# Patient Record
Sex: FEMALE | Race: OTHER | Employment: FULL TIME | URBAN - METROPOLITAN AREA
[De-identification: names, ages, dates, MRNs, and addresses within clinical notes are randomized per-mention and may not be internally consistent; named-entity substitution may affect disease eponyms.]

---
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
## 2018-08-23 PROCEDURE — 87081 CULTURE SCREEN ONLY: CPT | Performed by: EMERGENCY MEDICINE

## 2019-09-21 PROCEDURE — 87186 SC STD MICRODIL/AGAR DIL: CPT | Performed by: OBSTETRICS & GYNECOLOGY

## 2019-09-21 PROCEDURE — 87088 URINE BACTERIA CULTURE: CPT | Performed by: OBSTETRICS & GYNECOLOGY

## 2019-09-21 PROCEDURE — 87086 URINE CULTURE/COLONY COUNT: CPT | Performed by: OBSTETRICS & GYNECOLOGY

## 2020-04-15 PROBLEM — E66.9 OBESITY (BMI 30-39.9): Status: ACTIVE | Noted: 2020-04-15

## 2023-11-03 ENCOUNTER — OFFICE VISIT (OUTPATIENT)
Dept: OBGYN CLINIC | Facility: CLINIC | Age: 23
End: 2023-11-03

## 2023-11-03 VITALS
DIASTOLIC BLOOD PRESSURE: 81 MMHG | HEART RATE: 74 BPM | HEIGHT: 59 IN | BODY MASS INDEX: 30.44 KG/M2 | WEIGHT: 151 LBS | SYSTOLIC BLOOD PRESSURE: 114 MMHG | TEMPERATURE: 99 F

## 2023-11-03 DIAGNOSIS — Z11.3 SCREEN FOR STD (SEXUALLY TRANSMITTED DISEASE): ICD-10-CM

## 2023-11-03 DIAGNOSIS — Z28.21 INFLUENZA VACCINE REFUSED: Primary | ICD-10-CM

## 2023-11-03 DIAGNOSIS — Z01.419 WELL WOMAN EXAM WITH ROUTINE GYNECOLOGICAL EXAM: ICD-10-CM

## 2023-11-03 DIAGNOSIS — R10.2 PELVIC PAIN: ICD-10-CM

## 2023-11-03 DIAGNOSIS — Z30.431 IUD SURVEILLANCE: ICD-10-CM

## 2023-11-03 PROCEDURE — 87591 N.GONORRHOEAE DNA AMP PROB: CPT | Performed by: ADVANCED PRACTICE MIDWIFE

## 2023-11-03 PROCEDURE — 87491 CHLMYD TRACH DNA AMP PROBE: CPT | Performed by: ADVANCED PRACTICE MIDWIFE

## 2023-11-05 ENCOUNTER — APPOINTMENT (OUTPATIENT)
Dept: ULTRASOUND IMAGING | Facility: HOSPITAL | Age: 23
End: 2023-11-05
Attending: EMERGENCY MEDICINE
Payer: MEDICAID

## 2023-11-05 ENCOUNTER — HOSPITAL ENCOUNTER (EMERGENCY)
Facility: HOSPITAL | Age: 23
Discharge: HOME | End: 2023-11-05
Attending: EMERGENCY MEDICINE
Payer: MEDICAID

## 2023-11-05 VITALS
HEIGHT: 59 IN | TEMPERATURE: 99 F | DIASTOLIC BLOOD PRESSURE: 81 MMHG | WEIGHT: 140 LBS | HEART RATE: 91 BPM | RESPIRATION RATE: 19 BRPM | OXYGEN SATURATION: 96 % | BODY MASS INDEX: 28.22 KG/M2 | SYSTOLIC BLOOD PRESSURE: 141 MMHG

## 2023-11-05 DIAGNOSIS — R10.30 LOWER ABDOMINAL PAIN: Primary | ICD-10-CM

## 2023-11-05 DIAGNOSIS — N83.201 CYST OF RIGHT OVARY: ICD-10-CM

## 2023-11-05 LAB
ALBUMIN SERPL-MCNC: 4.5 G/DL (ref 3.2–4.8)
ALBUMIN/GLOB SERPL: 1.5 {RATIO} (ref 1–2)
ALP LIVER SERPL-CCNC: 89 U/L
ALT SERPL-CCNC: 10 U/L
ANION GAP SERPL CALC-SCNC: 7 MMOL/L (ref 0–18)
AST SERPL-CCNC: 15 U/L (ref ?–34)
B-HCG UR QL: NEGATIVE
BASOPHILS # BLD AUTO: 0.06 X10(3) UL (ref 0–0.2)
BASOPHILS NFR BLD AUTO: 0.6 %
BILIRUB SERPL-MCNC: 0.3 MG/DL (ref 0.3–1.2)
BILIRUB UR QL: NEGATIVE
BUN BLD-MCNC: 12 MG/DL (ref 9–23)
BUN/CREAT SERPL: 15 (ref 10–20)
CALCIUM BLD-MCNC: 9.5 MG/DL (ref 8.7–10.4)
CHLORIDE SERPL-SCNC: 103 MMOL/L (ref 98–112)
CO2 SERPL-SCNC: 26 MMOL/L (ref 21–32)
CREAT BLD-MCNC: 0.8 MG/DL
DEPRECATED RDW RBC AUTO: 43 FL (ref 35.1–46.3)
EGFRCR SERPLBLD CKD-EPI 2021: 105 ML/MIN/1.73M2 (ref 60–?)
EOSINOPHIL # BLD AUTO: 0.13 X10(3) UL (ref 0–0.7)
EOSINOPHIL NFR BLD AUTO: 1.2 %
ERYTHROCYTE [DISTWIDTH] IN BLOOD BY AUTOMATED COUNT: 13.2 % (ref 11–15)
GLOBULIN PLAS-MCNC: 3 G/DL (ref 2.8–4.4)
GLUCOSE BLD-MCNC: 90 MG/DL (ref 70–99)
GLUCOSE UR-MCNC: NORMAL MG/DL
HCT VFR BLD AUTO: 40.4 %
HGB BLD-MCNC: 13.6 G/DL
HGB UR QL STRIP.AUTO: NEGATIVE
IMM GRANULOCYTES # BLD AUTO: 0.03 X10(3) UL (ref 0–1)
IMM GRANULOCYTES NFR BLD: 0.3 %
KETONES UR-MCNC: NEGATIVE MG/DL
LEUKOCYTE ESTERASE UR QL STRIP.AUTO: 75
LYMPHOCYTES # BLD AUTO: 2.6 X10(3) UL (ref 1–4)
LYMPHOCYTES NFR BLD AUTO: 24.8 %
MCH RBC QN AUTO: 29.6 PG (ref 26–34)
MCHC RBC AUTO-ENTMCNC: 33.7 G/DL (ref 31–37)
MCV RBC AUTO: 88 FL
MONOCYTES # BLD AUTO: 0.89 X10(3) UL (ref 0.1–1)
MONOCYTES NFR BLD AUTO: 8.5 %
NEUTROPHILS # BLD AUTO: 6.77 X10 (3) UL (ref 1.5–7.7)
NEUTROPHILS # BLD AUTO: 6.77 X10(3) UL (ref 1.5–7.7)
NEUTROPHILS NFR BLD AUTO: 64.6 %
NITRITE UR QL STRIP.AUTO: NEGATIVE
OSMOLALITY SERPL CALC.SUM OF ELEC: 281 MOSM/KG (ref 275–295)
PH UR: 6.5 [PH] (ref 5–8)
PLATELET # BLD AUTO: 216 10(3)UL (ref 150–450)
POTASSIUM SERPL-SCNC: 3.9 MMOL/L (ref 3.5–5.1)
PROT SERPL-MCNC: 7.5 G/DL (ref 5.7–8.2)
PROT UR-MCNC: NEGATIVE MG/DL
RBC # BLD AUTO: 4.59 X10(6)UL
SODIUM SERPL-SCNC: 136 MMOL/L (ref 136–145)
SP GR UR STRIP: 1.02 (ref 1–1.03)
UROBILINOGEN UR STRIP-ACNC: NORMAL
WBC # BLD AUTO: 10.5 X10(3) UL (ref 4–11)

## 2023-11-05 PROCEDURE — 93975 VASCULAR STUDY: CPT | Performed by: EMERGENCY MEDICINE

## 2023-11-05 PROCEDURE — 76856 US EXAM PELVIC COMPLETE: CPT | Performed by: EMERGENCY MEDICINE

## 2023-11-05 PROCEDURE — 81001 URINALYSIS AUTO W/SCOPE: CPT | Performed by: EMERGENCY MEDICINE

## 2023-11-05 PROCEDURE — 81025 URINE PREGNANCY TEST: CPT

## 2023-11-05 PROCEDURE — 80053 COMPREHEN METABOLIC PANEL: CPT

## 2023-11-05 PROCEDURE — 87086 URINE CULTURE/COLONY COUNT: CPT | Performed by: EMERGENCY MEDICINE

## 2023-11-05 PROCEDURE — 85025 COMPLETE CBC W/AUTO DIFF WBC: CPT

## 2023-11-05 PROCEDURE — 85025 COMPLETE CBC W/AUTO DIFF WBC: CPT | Performed by: EMERGENCY MEDICINE

## 2023-11-05 PROCEDURE — 80053 COMPREHEN METABOLIC PANEL: CPT | Performed by: EMERGENCY MEDICINE

## 2023-11-05 PROCEDURE — 99284 EMERGENCY DEPT VISIT MOD MDM: CPT

## 2023-11-05 PROCEDURE — 76830 TRANSVAGINAL US NON-OB: CPT | Performed by: EMERGENCY MEDICINE

## 2023-11-05 PROCEDURE — 36415 COLL VENOUS BLD VENIPUNCTURE: CPT

## 2023-11-05 PROCEDURE — 99285 EMERGENCY DEPT VISIT HI MDM: CPT

## 2023-11-06 ENCOUNTER — TELEPHONE (OUTPATIENT)
Dept: OBGYN CLINIC | Facility: CLINIC | Age: 23
End: 2023-11-06

## 2023-11-06 DIAGNOSIS — R10.9 ABDOMINAL PAIN, UNSPECIFIED ABDOMINAL LOCATION: Primary | ICD-10-CM

## 2023-11-06 LAB
C TRACH DNA SPEC QL NAA+PROBE: NEGATIVE
N GONORRHOEA DNA SPEC QL NAA+PROBE: NEGATIVE

## 2023-11-06 NOTE — TELEPHONE ENCOUNTER
Patient called, in need of ultrasound. Patient called central scheduling and was directed to [facility] for ultrasound. Please call.

## 2023-11-06 NOTE — ED INITIAL ASSESSMENT (HPI)
Patient reports lower abdominal pain over the last few months, worsening today on the RLQ. Patient reports she was advised to schedule an US for symptoms but has not been able to. Patient reports an episode of vomiting today. Denies fever.

## 2023-11-07 NOTE — TELEPHONE ENCOUNTER
Name and  verified    Patient given PF recommendations. Provided scheduling information for [facility]. Referral placed for patient insurance. Upcoming appointment canceled.

## 2023-11-07 NOTE — TELEPHONE ENCOUNTER
I have reviewed the US results and would like for her to be referred to [facility] for removal of her IUD and evaluation of her ovary. She has an appointment with me next Tuesday 11/14 and would like that canceled.

## 2023-11-29 ENCOUNTER — OFFICE VISIT (OUTPATIENT)
Dept: OBGYN CLINIC | Facility: CLINIC | Age: 23
End: 2023-11-29
Payer: MEDICAID

## 2023-11-29 VITALS
DIASTOLIC BLOOD PRESSURE: 60 MMHG | WEIGHT: 153.13 LBS | SYSTOLIC BLOOD PRESSURE: 104 MMHG | HEIGHT: 59 IN | BODY MASS INDEX: 30.87 KG/M2

## 2023-11-29 DIAGNOSIS — Z30.013 INITIATION OF DEPO PROVERA: ICD-10-CM

## 2023-11-29 DIAGNOSIS — T83.32XA INTRAUTERINE DEVICE (IUD) MIGRATION, INITIAL ENCOUNTER: Primary | ICD-10-CM

## 2023-11-29 DIAGNOSIS — Z30.432 ENCOUNTER FOR REMOVAL OF INTRAUTERINE CONTRACEPTIVE DEVICE: ICD-10-CM

## 2023-11-29 PROCEDURE — 96372 THER/PROPH/DIAG INJ SC/IM: CPT | Performed by: OBSTETRICS & GYNECOLOGY

## 2023-11-29 PROCEDURE — 3074F SYST BP LT 130 MM HG: CPT | Performed by: OBSTETRICS & GYNECOLOGY

## 2023-11-29 PROCEDURE — 58301 REMOVE INTRAUTERINE DEVICE: CPT | Performed by: OBSTETRICS & GYNECOLOGY

## 2023-11-29 PROCEDURE — 3008F BODY MASS INDEX DOCD: CPT | Performed by: OBSTETRICS & GYNECOLOGY

## 2023-11-29 PROCEDURE — 99204 OFFICE O/P NEW MOD 45 MIN: CPT | Performed by: OBSTETRICS & GYNECOLOGY

## 2023-11-29 PROCEDURE — 3078F DIAST BP <80 MM HG: CPT | Performed by: OBSTETRICS & GYNECOLOGY

## 2023-11-29 RX ORDER — MEDROXYPROGESTERONE ACETATE 150 MG/ML
150 INJECTION, SUSPENSION INTRAMUSCULAR
Status: SHIPPED | OUTPATIENT
Start: 2023-11-29 | End: 2024-11-23

## 2023-11-29 RX ADMIN — MEDROXYPROGESTERONE ACETATE 150 MG: 150 INJECTION, SUSPENSION INTRAMUSCULAR at 13:07:00

## 2023-11-29 NOTE — PROCEDURES
[facility]  Obstetrics and Gynecology  IUD Removal Procedure Note  Vandana Butcher MD    IUD Removal:      Pregnancy Results: negative from urine test     Procedure discussed with the patient in detail including indication, risks, benefits, alternatives and complications. Consent signed. Pelvic Exam Findings:  Cervix normal. Uterus non tender    Procedure:  Speculum placed in the vagina. Betadine wash of vagina and cervix. Strings were visualized. Polyp forcep was used to grasp IUD strings. Mirena IUD was removed without difficulty. The patient tolerated the procedure well. Visit Plan:  Discharge instructions were reviewed with the patient.        Bennie Ruano MD  12:30PM  11/29/2023

## 2024-01-18 ENCOUNTER — LAB ENCOUNTER (OUTPATIENT)
Dept: LAB | Age: 24
End: 2024-01-18
Attending: STUDENT IN AN ORGANIZED HEALTH CARE EDUCATION/TRAINING PROGRAM
Payer: MEDICAID

## 2024-01-18 ENCOUNTER — OFFICE VISIT (OUTPATIENT)
Dept: DERMATOLOGY CLINIC | Facility: CLINIC | Age: 24
End: 2024-01-18

## 2024-01-18 ENCOUNTER — TELEPHONE (OUTPATIENT)
Dept: DERMATOLOGY CLINIC | Facility: CLINIC | Age: 24
End: 2024-01-18

## 2024-01-18 DIAGNOSIS — L70.0 ACNE VULGARIS: ICD-10-CM

## 2024-01-18 DIAGNOSIS — Z51.81 MEDICATION MONITORING ENCOUNTER: ICD-10-CM

## 2024-01-18 DIAGNOSIS — Z51.81 MEDICATION MONITORING ENCOUNTER: Primary | ICD-10-CM

## 2024-01-18 LAB
ALT SERPL-CCNC: 11 U/L
AST SERPL-CCNC: 15 U/L (ref ?–34)
B-HCG UR QL: NEGATIVE
TRIGL SERPL-MCNC: 76 MG/DL (ref 30–149)

## 2024-01-18 PROCEDURE — 84450 TRANSFERASE (AST) (SGOT): CPT

## 2024-01-18 PROCEDURE — 36415 COLL VENOUS BLD VENIPUNCTURE: CPT

## 2024-01-18 PROCEDURE — 84478 ASSAY OF TRIGLYCERIDES: CPT

## 2024-01-18 PROCEDURE — 99204 OFFICE O/P NEW MOD 45 MIN: CPT | Performed by: STUDENT IN AN ORGANIZED HEALTH CARE EDUCATION/TRAINING PROGRAM

## 2024-01-18 PROCEDURE — 84460 ALANINE AMINO (ALT) (SGPT): CPT

## 2024-01-18 PROCEDURE — 81025 URINE PREGNANCY TEST: CPT

## 2024-01-18 NOTE — TELEPHONE ENCOUNTER
Patient Name: Isabell Bergman   Street Address: [address] [place] [address]   State: [place]  Zip Code:[id number]   Telephone #: [phone]   E-Mail Address: [email or web address]   Preferred Method of Contact (e-mail or text):  Text  Date Consent Signed: 01/18/24    Childbearing Potential Patients  Primary Contraceptive: IUD  Secondary Contraceptive: Condoms    Labs in process.   Pt already registered on Ipledge(per below) and Mychart msg sent for pt to transfer provider.    This information already exists for a patient, with another prescriber. Please check with the patient to confirm if they have been previously enrolled in the [facility] [employer]. If the patient was previously enrolled, the patient must complete the prescriber transfer process by logging into the [employer] [facility] [employer] website and select \"Change My Prescriber\" from the main menu. If this patient is new to the [facility] and not a duplicate patient, please call the [facility] [employer] Contact Center for an override code.

## 2024-01-18 NOTE — PROGRESS NOTES
January 18, 2024    New patient     CHIEF COMPLAINT: Acne    HISTORY OF PRESENT ILLNESS: 2-3 yrs    1. Acne  Location: Chest and Back   Duration: 2-3 yrs  Signs and symptoms: Itchy, bumpy, flares around menstrual cycle  Current treatment: None  Past treatments: Tretinoin cream (didn't work)Accutane which was working but stopped using 2 yrs ago.      DERM HISTORY:  History of skin cancer: No  History of chronic skin disease/condition: No    FAMILY HISTORY:  History of melanoma: No  History of chronic skin disease/condition: No    History/Other:    REVIEW OF SYSTEMS:  Constitutional: Denies fever, chills, unintentional weight loss.   Skin as per HPI    PAST MEDICAL HISTORY:  Past Medical History:   Diagnosis Date    Encounter for insertion of ParaGard IUD 06/15/2021    STD exposure        Medications  Current Outpatient Medications   Medication Sig Dispense Refill    PARAGARD INTRAUTERINE COPPER IU by Intrauterine route.         Objective:    PHYSICAL EXAM:  General: awake, alert, no acute distress  Skin: Skin exam was performed today including the following: face and chest. Pertinent findings include:   - with erythematous papules    ASSESSMENT & PLAN:  Pathophysiology of diagnoses discussed with patient.  Therapeutic options reviewed. Risks, benefits, and alternatives discussed with patient. Instructions reviewed at length.    #Acne vulgaris - failed numerous oral medications including OCPs, tretinoin, clindamycin, topical doxycycline amoxicillin. Improved with accutane, but patient had low cumulative dose and now flaring up.      - Given severity of acne discussed need for systemic treatment discussed with patient and parent. Reviewed treatment options in detail including antibiotics vs spironolactone vs OCP vs isotretinoin or a combination of some of the aforementioned. Discussed that without systemic therapy, risks include irreversible scarring of affected areas.   - Pending blood work, will start isotretinoin 40  mg once daily with fatty meal   - Goal dose 200mg/kg = 68390 mg    - Discussed patient cannot become pregnant, breast feed, or donate blood while on the medication and for 1 month after stopping. Discussed that patient cannot share medication.  - Labs today  AST, ALT, fasting lipid panel, qualitative HCG  - Qualitative HCG to be repeated at least one month after initial testing and within the first 5 days of menstrual cycle. If pregnancy test negative at that time, script for isotretinoin to be sent to pharmacy and patient to be confirmed in iPledge.    Contraceptive methods: PRIMARY: IUD, SECONDARY:Male latex condomes s.      I discussed at length the issues of isotretnoin therapy including goal of treatment, medication dosage, duration of therapy, and side effects, as well as the alternative to care.     Patient denies personal/family hx of IBD  Patient denies personal/family hx of depression/suicide    Reviewed adverse effects including those that are common and not serious, such as dry skin (xerosis) with/without retinoid dermatitis, dry lips (cheilitis), dry nose (xeromycteria) with/without epistaxis, dry eyes (xerophthalmia), irritation of contact lenses, dyslipidemia (increase in cholesterol and triglycerides), phototoxicity, possible exacerbation/flaring of acne vulgaris in the first 4 to 6 weeks of therapy, and arthralgias/myalgias (muscle aches and joint pains), as well as those that are rare, but serious, such as pseudotumor cerebri, major depressive disorder, suicidal ideation, hepatotoxicity, pancreatitis, teratogenicity (females), anemia/leukopenia, changes to night vision, and possible unmasking or exacerbation of inflammatory bowel disease.    The patient understands there is a possibility of acne recurrence; however, acne is often easier to treat if it recurs after isotretinoin therapy. Patient agrees to not give blood during treatment and for 1 month after treatment. Patient agrees not to share  medication. Patient agrees remain on chosen forms of contraception while on the medication and continue 1 month after stopping. If there are any adverse events/symptoms including depression, thoughts of suicide, diarrhea, abdominal pain or cramping, blood in stool, or other concerning side effects, patient will contact us or head directly to ED for immediate evaluation. Patient informed to stop all other acne medications while on isotretinoin. Informed patient they can take Ibuprofen or NSAIDs for joint aches. Instructed to avoid Tylenol and alcohol due to increased risk of liver toxicity while on isotretinoin. Questions were answered, Patient expressed understanding of the risks, benefits, alternatives and guidelines and would like to proceed with isotretinoin therapy. Counseling and consents signed today and iPledge booklet given to patient.    Return to clinic: 2 months or sooner if something concerning arises     Devin Gabriel MD

## 2024-01-22 ENCOUNTER — PATIENT MESSAGE (OUTPATIENT)
Dept: DERMATOLOGY CLINIC | Facility: CLINIC | Age: 24
End: 2024-01-22

## 2024-01-22 DIAGNOSIS — L70.0 ACNE VULGARIS: Primary | ICD-10-CM

## 2024-01-29 NOTE — TELEPHONE ENCOUNTER
Dr. Gabriel - please see message from pt.  Transfer accepted in iPledge.  Please advise on next steps.  Thank you.  It looks like pt will need to be re-registered but I am not able to do that for some reason.

## 2024-01-30 DIAGNOSIS — L70.0 ACNE VULGARIS: Primary | ICD-10-CM

## 2024-01-30 RX ORDER — ISOTRETINOIN 40 MG/1
40 CAPSULE ORAL DAILY
Qty: 30 CAPSULE | Refills: 0 | Status: SHIPPED | OUTPATIENT
Start: 2024-01-30

## 2024-02-07 ENCOUNTER — MED REC SCAN ONLY (OUTPATIENT)
Dept: DERMATOLOGY CLINIC | Facility: CLINIC | Age: 24
End: 2024-02-07

## 2024-02-20 ENCOUNTER — PATIENT MESSAGE (OUTPATIENT)
Dept: DERMATOLOGY CLINIC | Facility: CLINIC | Age: 24
End: 2024-02-20

## 2024-02-27 NOTE — TELEPHONE ENCOUNTER
Devin Gabriel MD 2/21/2024 5:07 PM CST    Do I need to do an appeal. Sounds like it was denied.     Devin Gabriel MD    ----- Message -----  From: Isabell Bergman  Sent: 2/21/2024 12:31 PM CST  To: Devin Gabriel MD  Subject: Ipledge     Awesome! Will do. When you do send over the medication, do you mind sending it to the pharmacy in [facility] located at [address] [place] [address]. The pharmacy in [facility] that you had first sent it in to did not accept my insurance. Thank you!

## 2024-02-28 NOTE — TELEPHONE ENCOUNTER
Fax from [facility]    Denied for ISOTRETINOIN    Placed fax in pa inbox

## 2024-02-28 NOTE — TELEPHONE ENCOUNTER
Must try and fail for 2 weeks  Clindamycin solution, gel, lotion, swab  Clindacin ETZ pledgets swab  Erythromycin solution, gle  Erythromycin/benzoyl peroxide gel 5-3%    Please advise,ty.

## 2024-02-29 ENCOUNTER — LAB ENCOUNTER (OUTPATIENT)
Dept: LAB | Age: 24
End: 2024-02-29
Attending: STUDENT IN AN ORGANIZED HEALTH CARE EDUCATION/TRAINING PROGRAM
Payer: MEDICAID

## 2024-02-29 DIAGNOSIS — L70.0 ACNE VULGARIS: ICD-10-CM

## 2024-02-29 DIAGNOSIS — Z51.81 MEDICATION MONITORING ENCOUNTER: ICD-10-CM

## 2024-02-29 LAB — B-HCG UR QL: NEGATIVE

## 2024-02-29 PROCEDURE — 81025 URINE PREGNANCY TEST: CPT

## 2024-03-04 DIAGNOSIS — L70.0 ACNE VULGARIS: Primary | ICD-10-CM

## 2024-03-04 DIAGNOSIS — L70.0 NODULOCYSTIC ACNE: ICD-10-CM

## 2024-03-04 RX ORDER — ISOTRETINOIN 40 MG/1
40 CAPSULE ORAL DAILY
Qty: 30 CAPSULE | Refills: 0 | Status: SHIPPED | OUTPATIENT
Start: 2024-03-04

## 2024-03-04 NOTE — PROGRESS NOTES
Pt managed  Prescription Window  Patient can obtain their prescription from:  February 29, 2024 - March 06, 2024 (7 - Day Prescription window)    Pt aware of her window, she will call her pharmacy to make sure RX is covered (PA was submitted but we have not received a final decision)

## 2024-03-05 ENCOUNTER — TELEPHONE (OUTPATIENT)
Dept: DERMATOLOGY CLINIC | Facility: CLINIC | Age: 24
End: 2024-03-05

## 2024-03-05 RX ORDER — ERYTHROMYCIN 20 MG/G
GEL TOPICAL
Qty: 60 G | Refills: 0 | Status: SHIPPED | OUTPATIENT
Start: 2024-03-05

## 2024-03-05 RX ORDER — CLINDAMYCIN PHOSPHATE 10 UG/ML
1 LOTION TOPICAL 2 TIMES DAILY
Qty: 60 ML | Refills: 11 | Status: SHIPPED | OUTPATIENT
Start: 2024-03-05 | End: 2024-03-07

## 2024-03-05 NOTE — TELEPHONE ENCOUNTER
Please tell patient to use medicine for 2 weeks and to come see me at the end of this so we can get accutane approved.     Devin Gabriel MD

## 2024-03-05 NOTE — TELEPHONE ENCOUNTER
Called patient and informed them to call [employer]PLEDGE to assist with how they are answering questions since on our end it still shows no action needed from providers side.

## 2024-03-05 NOTE — TELEPHONE ENCOUNTER
Patient stated she was not able to do the questions for the Ipledge that she needed to contact the office. Please advise.

## 2024-03-06 ENCOUNTER — TELEPHONE (OUTPATIENT)
Dept: DERMATOLOGY CLINIC | Facility: CLINIC | Age: 24
End: 2024-03-06

## 2024-03-06 DIAGNOSIS — L70.0 ACNE VULGARIS: ICD-10-CM

## 2024-03-06 NOTE — TELEPHONE ENCOUNTER
S/w pt. Informed 2 RX were sent yesterday for her cleocin and erythromycin. Aware we will confirm cleocin sig with Dr. Gabriel (Dr. Gabriel - see below ms r/e pharmacy question)     She is aware erythomycin needs PA (I gave her a heads up that cleocin will most likely need one too, pt on medicaid, over the age of 25)    She is aware that IF approved, she needs to use them for 2 weeks and then see DM for follow up and hopefully transition to accutane.

## 2024-03-06 NOTE — TELEPHONE ENCOUNTER
Medication PA Requested:    erythromycin 2% gel                                                       CoverMyMeds Used:  Key:  Quantity: 60gm  Day Supply: 30  Sig: use once qam  DX Code:    L70.0                                 CPT code (if applicable):   Case Number/Pending Ref#:

## 2024-03-07 RX ORDER — CLINDAMYCIN PHOSPHATE 10 UG/ML
LOTION TOPICAL
Qty: 60 ML | Refills: 11 | Status: SHIPPED | OUTPATIENT
Start: 2024-03-07

## 2024-03-07 NOTE — TELEPHONE ENCOUNTER
Dr. Gabriel - I need you to please confirm sig on cleocin. RX sent for BID/QAM.    Will send pt mychart r/e goodrx.

## 2024-03-11 NOTE — TELEPHONE ENCOUNTER
Medication PA Requested:    erythromycin 2% gel                                                       CoverMyMeds Used:  Key:  Quantity: 60gm  Day Supply: 30  Sig: use once qam  DX Code:    L70.0                                 CPT code (if applicable):   Case Number/Pending Ref#:    ePA submitted with LOV 1/18/24  Awaiting determination

## 2024-03-13 ENCOUNTER — OFFICE VISIT (OUTPATIENT)
Dept: OBGYN CLINIC | Facility: CLINIC | Age: 24
End: 2024-03-13
Payer: MEDICAID

## 2024-03-13 VITALS
DIASTOLIC BLOOD PRESSURE: 60 MMHG | HEIGHT: 59 IN | BODY MASS INDEX: 29.46 KG/M2 | WEIGHT: 146.13 LBS | SYSTOLIC BLOOD PRESSURE: 108 MMHG

## 2024-03-13 DIAGNOSIS — Z30.09 CONTRACEPTIVE EDUCATION: Primary | ICD-10-CM

## 2024-03-13 DIAGNOSIS — N92.1 BREAKTHROUGH BLEEDING ON DEPO-PROVERA: ICD-10-CM

## 2024-03-13 PROBLEM — T83.32XA IUD MIGRATION: Status: RESOLVED | Noted: 2023-11-29 | Resolved: 2024-03-13

## 2024-03-13 PROBLEM — Z30.432 ENCOUNTER FOR REMOVAL OF INTRAUTERINE CONTRACEPTIVE DEVICE: Status: RESOLVED | Noted: 2023-11-29 | Resolved: 2024-03-13

## 2024-03-13 PROBLEM — Z30.013 INITIATION OF DEPO PROVERA: Status: RESOLVED | Noted: 2023-11-29 | Resolved: 2024-03-13

## 2024-03-13 PROCEDURE — 99213 OFFICE O/P EST LOW 20 MIN: CPT | Performed by: OBSTETRICS & GYNECOLOGY

## 2024-03-13 RX ORDER — NORGESTIMATE AND ETHINYL ESTRADIOL 7DAYSX3 LO
1 KIT ORAL DAILY
Qty: 90 TABLET | Refills: 3 | Status: SHIPPED | OUTPATIENT
Start: 2024-03-13 | End: 2025-03-08

## 2024-03-13 NOTE — PROGRESS NOTES
Isabell Bergman is a 23 year old female.    HPI:     Chief Complaint   Patient presents with    Contraception     Pt here for birth control. Switching from depo     Irregular Periods     On depo        Last visit 11/29/23 for IUD removal and beginning Depo-Provera here today for contraceptive change due to breakthrough bleeding. Having issues with acne so interested in trial with OCP. New start teaching done - to begin with next menses.     Medications (Active prior to today's visit):  Current Outpatient Medications   Medication Sig Dispense Refill    clindamycin 1 % External Lotion Apply thin film to affected area(s) once every morning (Patient not taking: Reported on 3/13/2024) 60 mL 11    Erythromycin 2 % External Gel Use once every evening (Patient not taking: Reported on 3/13/2024) 60 g 0    Isotretinoin 40 MG Oral Cap Take 1 capsule (40 mg total) by mouth daily. (Patient not taking: Reported on 3/13/2024) 30 capsule 0    Isotretinoin 40 MG Oral Cap Take 1 capsule (40 mg total) by mouth daily. (Patient not taking: Reported on 3/13/2024) 30 capsule 0       Allergies:  No Known Allergies    /60   Ht 59\"   Wt 146 lb 1.6 oz (66.3 kg)   LMP 03/11/2024 (Exact Date)   BMI 29.51 kg/m²        ASSESSMENT/PLAN:   Assessment       1. Breakthrough bleeding on Depo-Provera  - Discontinue    2. Prescription for oral contraceptive pill  - Start Triphasic OCP  - Will call for new IUD if unable to tolerate       Total time spent = 15 minutes  >50% of visit = face to face discussion and coordination of care        3/13/2024  Brittany Lucia MD

## 2024-04-30 ENCOUNTER — TELEPHONE (OUTPATIENT)
Dept: DERMATOLOGY CLINIC | Facility: CLINIC | Age: 24
End: 2024-04-30

## 2024-04-30 NOTE — TELEPHONE ENCOUNTER
- Pt's Accutane approved. Pt moved to inactive status in IPLEDGE. Pt will need to be re-registered. Pt informed to proceed with first pregnancy test for now.

## 2024-04-30 NOTE — TELEPHONE ENCOUNTER
Call from Patient    Asking to speak to RN      States was informed by insurance company that the Isotretinoin was approved. Please call

## 2024-05-22 ENCOUNTER — TELEPHONE (OUTPATIENT)
Dept: OBGYN CLINIC | Facility: CLINIC | Age: 24
End: 2024-05-22

## 2024-05-22 RX ORDER — NORGESTIMATE AND ETHINYL ESTRADIOL 7DAYSX3 LO
1 KIT ORAL DAILY
Qty: 90 TABLET | Refills: 3 | Status: SHIPPED | OUTPATIENT
Start: 2024-05-22 | End: 2025-05-17

## 2024-05-22 RX ORDER — MISOPROSTOL 200 UG/1
TABLET ORAL
Qty: 1 TABLET | Refills: 0 | Status: SHIPPED | OUTPATIENT
Start: 2024-05-22

## 2024-05-22 NOTE — TELEPHONE ENCOUNTER
Appointment canceled today due to patient being late for 12:45 appointment. Appointment rescheduled and pt told to continue on current ocp's until appointment. Pt states she will need refill to late until 06/18/2024( IUD appointment). OCP's and Cytotec send to pt's pharmacy.